# Patient Record
Sex: FEMALE | Race: WHITE | HISPANIC OR LATINO | Employment: FULL TIME | URBAN - METROPOLITAN AREA
[De-identification: names, ages, dates, MRNs, and addresses within clinical notes are randomized per-mention and may not be internally consistent; named-entity substitution may affect disease eponyms.]

---

## 2017-04-12 ENCOUNTER — ALLSCRIPTS OFFICE VISIT (OUTPATIENT)
Dept: OTHER | Facility: OTHER | Age: 27
End: 2017-04-12

## 2017-04-24 LAB
ADDITIONAL INFORMATION (HISTORICAL): NORMAL
ADEQUACY: (HISTORICAL): NORMAL
CYTOTECHNOLOGIST: (HISTORICAL): NORMAL
INTERPRETATION (HISTORICAL): NORMAL
LMP (HISTORICAL): NORMAL
PREV. BX: (HISTORICAL): NORMAL
PREV. PAP (HISTORICAL): NORMAL
REVIEWED BY (HISTORICAL): NORMAL
SOURCE (HISTORICAL): NORMAL

## 2017-11-15 ENCOUNTER — GENERIC CONVERSION - ENCOUNTER (OUTPATIENT)
Dept: OTHER | Facility: OTHER | Age: 27
End: 2017-11-15

## 2017-11-21 ENCOUNTER — LAB CONVERSION - ENCOUNTER (OUTPATIENT)
Dept: OTHER | Facility: OTHER | Age: 27
End: 2017-11-21

## 2017-11-21 LAB
ADDITIONAL INFORMATION (HISTORICAL): NORMAL
ADEQUACY: (HISTORICAL): NORMAL
COMMENT (HISTORICAL): NORMAL
CYTOTECHNOLOGIST: (HISTORICAL): NORMAL
INTERPRETATION (HISTORICAL): NORMAL
LMP (HISTORICAL): NORMAL
PREV. BX: (HISTORICAL): NORMAL
PREV. PAP (HISTORICAL): NORMAL
REVIEWED BY (HISTORICAL): NORMAL
SOURCE (HISTORICAL): NORMAL

## 2018-01-10 NOTE — RESULT NOTES
Verified Results  (Q) TISSUE PATHOLOGY 25Oct2016 12:00AM Heidy Brennan     Test Name Result Flag Reference   CLINICAL INFORMATION      none given   PATHOLOGIST      Mariluz Campos MD, (electronic signature)  Boarded in Anatomic and Clinical Pathology,  Cytopathology

## 2018-01-13 NOTE — RESULT NOTES
Verified Results  (Q) THINPREP PAP REFLEX HPV mRNA E6/E7 [id number] 12:00AM Vikki Hernadez     Test Name Result Flag Reference   CLINICAL INFORMATION:      YEARLY   LMP:      112337   PREV  PAP:      2015   PREV  BX:      NONE GIVEN   SOURCE:      Cervix   STATEMENT OF ADEQUACY:      Satisfactory for evaluation  Endocervical/transformation zone component  present     GENERAL CATEGORIZATION:  A    EPITHELIAL CELL ABNORMALITY   INTERPRETATION/RESULT:  A    Low Grade Squamous Intraepithelial Lesion (LSIL)   COMMENT:      Suggest clinical correlation and follow-up as  clinically appropriate   CYTOTECHNOLOGIST:      CHRISTIE CRAMER(ASCP)  CT screening location: [address] [facility],  [address]:      Bhavin Morris MD, (electronic signature)  Boarded in Anatomic and Clinical Pathology,  Cytopathology

## 2018-01-17 NOTE — RESULT NOTES
Verified Results  TISSUE, 2 SPECIMENS 25Oct2016 12:00AM Erika Flight     Test Name Result Flag Reference   A SOURCE      Endocervix   A PROCEDURE      Curettage   A GROSS DESCRIPTION      Ecc  In formalin  Fragments of soft tan, brown   tissue and mucus; 2 7 x 2 4 x 0 3 cm in   aggregate  A S  - 1  Gross exam(s) performed at: [address] [facility] [address], [address] [place] [id number]    : Megan Chandler MD PHD   A DIAGNOSIS      - MINUTE DETACHED FRAGMENT OF ATYPICAL SQUAMOUS   EPITHELIUM, TOO SMALL FOR FURTHER EVALUATION  - UNREMARKABLE ENDOCERVICAL TISSUE    B SOURCE      Cervix, 12 o'clock   B PROCEDURE      Biopsy/ies   B GROSS DESCRIPTION      12:00  In formalin  Single fragment of tan,   brown tissue; 0 6 x 0 4 x 0 1 cm  A S  1 in 1    B DIAGNOSIS      - SQUAMOUS ATYPIA  - The atypia noted is inconclusive for HPV effect

## 2018-01-22 VITALS
HEIGHT: 67 IN | BODY MASS INDEX: 19.78 KG/M2 | DIASTOLIC BLOOD PRESSURE: 62 MMHG | WEIGHT: 126 LBS | SYSTOLIC BLOOD PRESSURE: 100 MMHG

## 2018-10-25 ENCOUNTER — TELEPHONE (OUTPATIENT)
Dept: OBGYN CLINIC | Facility: CLINIC | Age: 28
End: 2018-10-25

## 2018-10-25 DIAGNOSIS — Z30.41 ENCOUNTER FOR SURVEILLANCE OF CONTRACEPTIVE PILLS: Primary | ICD-10-CM

## 2018-10-25 RX ORDER — NORETHINDRONE ACETATE AND ETHINYL ESTRADIOL AND FERROUS FUMARATE 1MG-20(24)
1 KIT ORAL DAILY
Qty: 24 TABLET | Refills: 1 | Status: SHIPPED | OUTPATIENT
Start: 2018-10-25 | End: 2018-11-29 | Stop reason: SDUPTHER

## 2018-10-25 NOTE — TELEPHONE ENCOUNTER
Pt has appointment scheduled for the end of November  Needs one refill of mibalis 24 fe to the [facility] to get her through please

## 2018-11-29 ENCOUNTER — ANNUAL EXAM (OUTPATIENT)
Dept: OBGYN CLINIC | Facility: CLINIC | Age: 28
End: 2018-11-29
Payer: COMMERCIAL

## 2018-11-29 VITALS
BODY MASS INDEX: 19.56 KG/M2 | SYSTOLIC BLOOD PRESSURE: 110 MMHG | DIASTOLIC BLOOD PRESSURE: 72 MMHG | WEIGHT: 124.6 LBS | HEIGHT: 67 IN

## 2018-11-29 DIAGNOSIS — Z30.41 ENCOUNTER FOR SURVEILLANCE OF CONTRACEPTIVE PILLS: ICD-10-CM

## 2018-11-29 DIAGNOSIS — Z01.419 ENCOUNTER FOR ANNUAL ROUTINE GYNECOLOGICAL EXAMINATION: Primary | ICD-10-CM

## 2018-11-29 DIAGNOSIS — R10.2 PELVIC PAIN: ICD-10-CM

## 2018-11-29 PROCEDURE — 99395 PREV VISIT EST AGE 18-39: CPT | Performed by: OBSTETRICS & GYNECOLOGY

## 2018-11-29 RX ORDER — NORETHINDRONE ACETATE AND ETHINYL ESTRADIOL AND FERROUS FUMARATE 1MG-20(24)
1 KIT ORAL DAILY
Qty: 84 TABLET | Refills: 3 | Status: SHIPPED | OUTPATIENT
Start: 2018-11-29 | End: 2019-10-10 | Stop reason: SDUPTHER

## 2018-11-29 NOTE — PROGRESS NOTES
Assessment/Plan:    Pap smear deferred due to low risk status  Encouraged self-breast examination as well as calcium supplementation  Reviewed cultures done from outside facility, within normal limits  Recommend pelvic ultrasound given nonspecific lower pelvic pain  She will also use a lubricant during intercourse  If this is unsuccessful I did discussed possibly changing birth control pills  In the meantime she will continue Minastrin 24 x 1 year  Return to office in 1 year  No problem-specific Assessment & Plan notes found for this encounter  Diagnoses and all orders for this visit:    Encounter for annual routine gynecological examination  -     Thinprep Tis Pap Reflex HPV mRNA E6/E7    Encounter for surveillance of contraceptive pills  -     Norethin Ace-Eth Estrad-FE (MINASTRIN 24 FE) 1-20 MG-MCG(24) CHEW; Chew 1 tablet daily    Pelvic pain  -     US pelvis complete w transvaginal; Future          Subjective:      Patient ID: Erlin Granados is a 34 y o  female  HPI     This is a 53-year-old female G0 who presents for annual gyn exam   Patient has been on current birth control pills for the last 5 years  Her cycles were regular up until last year, every 4 weeks lasting 4 days until last year  She states her bleeding is minimal with scant spotting  She has been on several different generic forms of birth control pill including more recently in the last 2 months  She is also sexually active and has been with a new partner over the last 6-8 weeks  She has complained of vaginal discomfort over the last 3 weeks  She was seen at an outside facility and underwent STD screening which was negative  She denies any vaginal discharge or changes in bladder or bowel function  Her urinalysis was also negative  Pap smear 2017 was within normal limits  She did receive the Gardasil vaccine at age 27  Patient graduated as  working for cosmetic facility      The following portions of the patient's history were reviewed and updated as appropriate: allergies, current medications, past family history, past medical history, past social history, past surgical history and problem list     Review of Systems   Constitutional: Negative for fatigue, fever and unexpected weight change  Respiratory: Negative for cough, chest tightness, shortness of breath and wheezing  Cardiovascular: Negative  Negative for chest pain and palpitations  Gastrointestinal: Negative  Negative for abdominal distention, abdominal pain, blood in stool, constipation, diarrhea, nausea and vomiting  Genitourinary: Negative  Negative for difficulty urinating, dyspareunia, dysuria, flank pain, frequency, genital sores, hematuria, pelvic pain, urgency, vaginal bleeding, vaginal discharge and vaginal pain  Skin: Negative for rash  Objective:      /72   Ht 5' 7" (1 702 m)   Wt 56 5 kg (124 lb 9 6 oz)   Breastfeeding? No   BMI 19 52 kg/m²          Physical Exam   Constitutional: She appears well-developed and well-nourished  Cardiovascular: Normal rate and regular rhythm  Pulmonary/Chest: Effort normal and breath sounds normal  Right breast exhibits no inverted nipple, no mass, no nipple discharge, no skin change and no tenderness  Left breast exhibits no inverted nipple, no mass, no nipple discharge, no skin change and no tenderness  Abdominal: Soft  Bowel sounds are normal  She exhibits no distension  There is no tenderness  There is no rebound and no guarding  Genitourinary: Vagina normal and uterus normal  There is no lesion on the right labia  There is no lesion on the left labia  Cervix exhibits no discharge and no friability  Right adnexum displays no mass, no tenderness and no fullness  Left adnexum displays no mass, no tenderness and no fullness  No vaginal discharge found

## 2018-12-04 LAB
CLINICAL INFO: NORMAL
CYTO CVX: NORMAL
CYTOLOGY CMNT CVX/VAG CYTO-IMP: NORMAL
DATE PREVIOUS BX: NORMAL
LMP START DATE: NORMAL
SL AMB PREV. PAP:: NORMAL
SPECIMEN SOURCE CVX/VAG CYTO: NORMAL

## 2018-12-07 ENCOUNTER — TELEPHONE (OUTPATIENT)
Dept: OBGYN CLINIC | Facility: CLINIC | Age: 28
End: 2018-12-07

## 2019-10-10 DIAGNOSIS — Z30.41 ENCOUNTER FOR SURVEILLANCE OF CONTRACEPTIVE PILLS: ICD-10-CM

## 2019-10-10 RX ORDER — NORETHINDRONE ACETATE AND ETHINYL ESTRADIOL AND FERROUS FUMARATE 1MG-20(24)
KIT ORAL
Qty: 84 TABLET | Refills: 0 | Status: SHIPPED | OUTPATIENT
Start: 2019-10-10 | End: 2019-11-04 | Stop reason: SDUPTHER

## 2019-11-04 ENCOUNTER — ANNUAL EXAM (OUTPATIENT)
Dept: OBGYN CLINIC | Facility: CLINIC | Age: 29
End: 2019-11-04
Payer: COMMERCIAL

## 2019-11-04 VITALS
SYSTOLIC BLOOD PRESSURE: 112 MMHG | WEIGHT: 132 LBS | DIASTOLIC BLOOD PRESSURE: 80 MMHG | BODY MASS INDEX: 20.72 KG/M2 | HEIGHT: 67 IN

## 2019-11-04 DIAGNOSIS — Z11.3 SCREEN FOR STD (SEXUALLY TRANSMITTED DISEASE): ICD-10-CM

## 2019-11-04 DIAGNOSIS — Z01.419 ENCOUNTER FOR ANNUAL ROUTINE GYNECOLOGICAL EXAMINATION: Primary | ICD-10-CM

## 2019-11-04 DIAGNOSIS — Z30.41 ENCOUNTER FOR SURVEILLANCE OF CONTRACEPTIVE PILLS: ICD-10-CM

## 2019-11-04 PROCEDURE — 87591 N.GONORRHOEAE DNA AMP PROB: CPT | Performed by: OBSTETRICS & GYNECOLOGY

## 2019-11-04 PROCEDURE — 87491 CHLMYD TRACH DNA AMP PROBE: CPT | Performed by: OBSTETRICS & GYNECOLOGY

## 2019-11-04 PROCEDURE — 99395 PREV VISIT EST AGE 18-39: CPT | Performed by: OBSTETRICS & GYNECOLOGY

## 2019-11-04 RX ORDER — NORETHINDRONE ACETATE AND ETHINYL ESTRADIOL AND FERROUS FUMARATE 1MG-20(24)
1 KIT ORAL DAILY
Qty: 84 TABLET | Refills: 3 | Status: SHIPPED | OUTPATIENT
Start: 2019-11-04 | End: 2020-11-09 | Stop reason: SDUPTHER

## 2019-11-04 NOTE — PROGRESS NOTES
Assessment/Plan:  Pap smear deferred due to low risk status  Cultures were obtained for GC and chlamydia off of thin prep  She is also requesting HIV/RPR screening  Encouraged safe sex practices  Continue OCPs x1 year  Return to office in 1 year or p r n  No problem-specific Assessment & Plan notes found for this encounter  Diagnoses and all orders for this visit:    Encounter for annual routine gynecological examination    Encounter for surveillance of contraceptive pills  -     Norethin Ace-Eth Estrad-FE (MELODETTA 24 FE) 1-20 MG-MCG(24) CHEW; Chew 1 tablet daily Chew    Screen for STD (sexually transmitted disease)  -     RPR  -     HIV 1/2 AG-AB combo; Future  -     Chlamydia/GC amplified DNA by PCR          Subjective:      Patient ID: Jesus Young is a 34 y o  female  HPI     This is a 82-year-old female G0 who presents for her annual gyn exam   Patient has been on birth control pills for the last 6 years  Her cycles are regular every 4 weeks lasting 2-3 days described as very light  She denies any breakthrough bleeding  There has been no changes in bowel or bladder function  In the course of the year she has had 3 partners  She does not use condoms regularly  Patient received the Gardasil vaccine at age 27  Her Pap smear 2018 within normal limits  The following portions of the patient's history were reviewed and updated as appropriate: allergies, current medications, past family history, past medical history, past social history, past surgical history and problem list     Review of Systems   Constitutional: Negative for fatigue, fever and unexpected weight change  Respiratory: Negative for cough, chest tightness, shortness of breath and wheezing  Cardiovascular: Negative  Negative for chest pain and palpitations  Gastrointestinal: Negative  Negative for abdominal distention, abdominal pain, blood in stool, constipation, diarrhea, nausea and vomiting  Genitourinary: Negative  Negative for difficulty urinating, dyspareunia, dysuria, flank pain, frequency, genital sores, hematuria, pelvic pain, urgency, vaginal bleeding, vaginal discharge and vaginal pain  Skin: Negative for rash  Objective:      /80   Ht 5' 7" (1 702 m)   Wt 59 9 kg (132 lb)   LMP 10/30/2019 (Exact Date)   BMI 20 67 kg/m²          Physical Exam   Constitutional: She appears well-developed and well-nourished  Cardiovascular: Normal rate and regular rhythm  Pulmonary/Chest: Effort normal and breath sounds normal  Right breast exhibits no inverted nipple, no mass, no nipple discharge, no skin change and no tenderness  Left breast exhibits no inverted nipple, no mass, no nipple discharge, no skin change and no tenderness  Abdominal: Soft  Bowel sounds are normal  She exhibits no distension  There is no tenderness  There is no rebound and no guarding  Genitourinary: Vagina normal and uterus normal  There is no lesion on the right labia  There is no lesion on the left labia  Cervix exhibits no discharge and no friability  Right adnexum displays no mass, no tenderness and no fullness  Left adnexum displays no mass, no tenderness and no fullness  No vaginal discharge found

## 2019-11-06 LAB
C TRACH DNA SPEC QL NAA+PROBE: NEGATIVE
N GONORRHOEA DNA SPEC QL NAA+PROBE: NEGATIVE

## 2019-11-11 LAB
HIV 1+2 AB+HIV1 P24 AG SERPL QL IA: NORMAL
RPR SER QL: NORMAL

## 2019-11-12 ENCOUNTER — TELEPHONE (OUTPATIENT)
Dept: OBGYN CLINIC | Facility: CLINIC | Age: 29
End: 2019-11-12

## 2020-11-09 ENCOUNTER — ANNUAL EXAM (OUTPATIENT)
Dept: OBGYN CLINIC | Facility: CLINIC | Age: 30
End: 2020-11-09
Payer: COMMERCIAL

## 2020-11-09 VITALS
WEIGHT: 129.4 LBS | DIASTOLIC BLOOD PRESSURE: 72 MMHG | BODY MASS INDEX: 20.31 KG/M2 | SYSTOLIC BLOOD PRESSURE: 110 MMHG | TEMPERATURE: 97.7 F | HEIGHT: 67 IN

## 2020-11-09 DIAGNOSIS — Z01.419 ENCOUNTER FOR ANNUAL ROUTINE GYNECOLOGICAL EXAMINATION: Primary | ICD-10-CM

## 2020-11-09 DIAGNOSIS — Z30.41 ENCOUNTER FOR SURVEILLANCE OF CONTRACEPTIVE PILLS: ICD-10-CM

## 2020-11-09 PROCEDURE — S0612 ANNUAL GYNECOLOGICAL EXAMINA: HCPCS | Performed by: OBSTETRICS & GYNECOLOGY

## 2020-11-09 PROCEDURE — G0145 SCR C/V CYTO,THINLAYER,RESCR: HCPCS | Performed by: OBSTETRICS & GYNECOLOGY

## 2020-11-09 RX ORDER — SPIRONOLACTONE 25 MG/1
TABLET ORAL
COMMUNITY
Start: 2020-10-30

## 2020-11-09 RX ORDER — NORETHINDRONE ACETATE AND ETHINYL ESTRADIOL AND FERROUS FUMARATE 1MG-20(24)
1 KIT ORAL DAILY
Qty: 84 TABLET | Refills: 3 | Status: SHIPPED | OUTPATIENT
Start: 2020-11-09 | End: 2021-11-03 | Stop reason: SDUPTHER

## 2020-11-09 RX ORDER — METFORMIN HYDROCHLORIDE 500 MG/1
TABLET, EXTENDED RELEASE ORAL
COMMUNITY
Start: 2020-09-15

## 2020-11-13 LAB
LAB AP GYN PRIMARY INTERPRETATION: NORMAL
Lab: NORMAL

## 2021-11-03 ENCOUNTER — ANNUAL EXAM (OUTPATIENT)
Dept: OBGYN CLINIC | Facility: CLINIC | Age: 31
End: 2021-11-03
Payer: COMMERCIAL

## 2021-11-03 VITALS
SYSTOLIC BLOOD PRESSURE: 108 MMHG | HEIGHT: 67 IN | DIASTOLIC BLOOD PRESSURE: 70 MMHG | WEIGHT: 130 LBS | BODY MASS INDEX: 20.4 KG/M2

## 2021-11-03 DIAGNOSIS — Z01.419 ENCOUNTER FOR ANNUAL ROUTINE GYNECOLOGICAL EXAMINATION: Primary | ICD-10-CM

## 2021-11-03 DIAGNOSIS — R73.03 PRE-DIABETES: ICD-10-CM

## 2021-11-03 DIAGNOSIS — Z30.41 ENCOUNTER FOR SURVEILLANCE OF CONTRACEPTIVE PILLS: ICD-10-CM

## 2021-11-03 PROCEDURE — 99395 PREV VISIT EST AGE 18-39: CPT | Performed by: OBSTETRICS & GYNECOLOGY

## 2021-11-03 RX ORDER — MAGNESIUM OXIDE 400 MG/1
400 TABLET ORAL
COMMUNITY

## 2021-11-03 RX ORDER — FOLIC ACID 1 MG/1
1 TABLET ORAL
COMMUNITY

## 2021-11-03 RX ORDER — NORETHINDRONE ACETATE AND ETHINYL ESTRADIOL AND FERROUS FUMARATE 1MG-20(24)
1 KIT ORAL DAILY
Qty: 84 TABLET | Refills: 3 | Status: SHIPPED | OUTPATIENT
Start: 2021-11-03 | End: 2021-11-19 | Stop reason: SDUPTHER

## 2021-11-03 RX ORDER — SACCHAROMYCES BOULARDII 250 MG
250 CAPSULE ORAL
COMMUNITY

## 2021-11-03 RX ORDER — ERGOCALCIFEROL (VITAMIN D2) 1250 MCG
50000 CAPSULE ORAL
COMMUNITY

## 2021-11-19 DIAGNOSIS — Z30.41 ENCOUNTER FOR SURVEILLANCE OF CONTRACEPTIVE PILLS: ICD-10-CM

## 2021-11-21 RX ORDER — NORETHINDRONE ACETATE AND ETHINYL ESTRADIOL AND FERROUS FUMARATE 1MG-20(24)
1 KIT ORAL DAILY
Qty: 84 TABLET | Refills: 3 | Status: SHIPPED | OUTPATIENT
Start: 2021-11-21

## 2022-11-03 ENCOUNTER — ANNUAL EXAM (OUTPATIENT)
Dept: OBGYN CLINIC | Facility: CLINIC | Age: 32
End: 2022-11-03

## 2022-11-03 VITALS
BODY MASS INDEX: 21.19 KG/M2 | WEIGHT: 135 LBS | SYSTOLIC BLOOD PRESSURE: 110 MMHG | DIASTOLIC BLOOD PRESSURE: 72 MMHG | HEIGHT: 67 IN

## 2022-11-03 DIAGNOSIS — Z01.419 ENCOUNTER FOR ANNUAL ROUTINE GYNECOLOGICAL EXAMINATION: Primary | ICD-10-CM

## 2022-11-03 DIAGNOSIS — Z30.41 ENCOUNTER FOR SURVEILLANCE OF CONTRACEPTIVE PILLS: ICD-10-CM

## 2022-11-03 RX ORDER — NORETHINDRONE ACETATE AND ETHINYL ESTRADIOL AND FERROUS FUMARATE 1MG-20(24)
1 KIT ORAL DAILY
Qty: 84 TABLET | Refills: 3 | Status: SHIPPED | OUTPATIENT
Start: 2022-11-03

## 2022-11-03 NOTE — PROGRESS NOTES
Assessment/Plan:  Pap smear done as well as annual   Encouraged self-breast examination as well as calcium supplementation  Continue Loestrin 1/20 x1 year  Discussed changing OCPs to improve menstrual cycle  She will notify me if she is interested  We also discussed potential future pregnancy, pre conceptual folic acid  All questions answered  She will return to office in 1 year or p r n  No problem-specific Assessment & Plan notes found for this encounter  Diagnoses and all orders for this visit:    Encounter for annual routine gynecological examination  -     Thinprep Tis and HPV mRNA E6/E7    Encounter for surveillance of contraceptive pills  -     Norethin Ace-Eth Estrad-FE (Melodetta 24 Fe) 1-20 MG-MCG(24) CHEW; Chew 1 tablet daily Chew          Subjective:      Patient ID: Kim Talbot is a 29 y o  female  HPI     This is a very pleasant 29-year-old female G0 presents for gyn exam   She states her menstrual cycles have been approximately every 4 weeks lasting 1-7 days with brown spotting  She has been on current birth control pill for over 10 years  She is sexually active and has been in a monogamous relationship for 4 years  She will be getting  04/2023 in [facility]  Her and her partner live in [place]  She did receive the Gardasil vaccine age 27  She did have a colposcopy in 2016  Pap smears have been normal since then  Patient has been on metformin for approximately 3 years, diagnosed with pre diabetes this is managed through her primary care physician  The following portions of the patient's history were reviewed and updated as appropriate: allergies, current medications, past family history, past medical history, past social history, past surgical history and problem list     Review of Systems   Constitutional: Negative for fatigue, fever and unexpected weight change  Respiratory: Negative for cough, chest tightness, shortness of breath and wheezing      Cardiovascular: Negative  Negative for chest pain and palpitations  Gastrointestinal: Negative  Negative for abdominal distention, abdominal pain, blood in stool, constipation, diarrhea, nausea and vomiting  Genitourinary: Negative  Negative for difficulty urinating, dyspareunia, dysuria, flank pain, frequency, genital sores, hematuria, pelvic pain, urgency, vaginal bleeding, vaginal discharge and vaginal pain  Skin: Negative for rash  Objective:      /72   Ht 5' 7" (1 702 m)   Wt 61 2 kg (135 lb)   LMP 10/29/2022   BMI 21 14 kg/m²          Physical Exam  Constitutional:       Appearance: Normal appearance  She is well-developed  Cardiovascular:      Rate and Rhythm: Normal rate and regular rhythm  Pulmonary:      Effort: Pulmonary effort is normal       Breath sounds: Normal breath sounds  Chest:   Breasts:      Right: No inverted nipple, mass, nipple discharge, skin change or tenderness  Left: No inverted nipple, mass, nipple discharge, skin change or tenderness  Abdominal:      General: Bowel sounds are normal  There is no distension  Palpations: Abdomen is soft  Tenderness: There is no abdominal tenderness  There is no guarding or rebound  Genitourinary:     Labia:         Right: No rash, tenderness or lesion  Left: No rash, tenderness or lesion  Vagina: Normal  No signs of injury  No vaginal discharge or tenderness  Cervix: No cervical motion tenderness, discharge, friability, lesion, erythema or cervical bleeding  Uterus: Not enlarged and not tender  Adnexa:         Right: No mass, tenderness or fullness  Left: No mass, tenderness or fullness  Neurological:      Mental Status: She is alert and oriented to person, place, and time     Psychiatric:         Behavior: Behavior normal

## 2022-11-09 ENCOUNTER — TELEPHONE (OUTPATIENT)
Dept: OBGYN CLINIC | Facility: CLINIC | Age: 32
End: 2022-11-09

## 2022-11-09 LAB
CLINICAL INFO: ABNORMAL
CYTO CVX: ABNORMAL
CYTOLOGY CMNT CVX/VAG CYTO-IMP: ABNORMAL
DATE PREVIOUS BX: ABNORMAL
GEN CATEG CVX/VAG CYTO-IMP: ABNORMAL
HPV E6+E7 MRNA CVX QL NAA+PROBE: DETECTED
LMP START DATE: ABNORMAL
SL AMB PREV. PAP:: ABNORMAL
SPECIMEN SOURCE CVX/VAG CYTO: ABNORMAL

## 2022-11-09 NOTE — TELEPHONE ENCOUNTER
----- Message from David Corona DO sent at 11/9/2022  1:04 PM EST -----  Inform patient Pap smear slightly abnormal, recommend colposcopy  NSAIDs prior to office visit

## 2022-11-09 NOTE — TELEPHONE ENCOUNTER
Pt informed of pap results & recom f/u with colposcopy  Pt prev had colposcopy 2016 for LGSIL  Pt lives in [facility] & will be here in 2/2022 - appt scheduled

## 2023-02-20 ENCOUNTER — PROCEDURE VISIT (OUTPATIENT)
Dept: OBGYN CLINIC | Facility: CLINIC | Age: 33
End: 2023-02-20

## 2023-02-20 VITALS
HEIGHT: 67 IN | BODY MASS INDEX: 20.88 KG/M2 | DIASTOLIC BLOOD PRESSURE: 68 MMHG | WEIGHT: 133 LBS | SYSTOLIC BLOOD PRESSURE: 106 MMHG

## 2023-02-20 DIAGNOSIS — R87.810 ATYPICAL SQUAMOUS CELL CHANGES OF UNDETERMINED SIGNIFICANCE (ASCUS) ON CERVICAL CYTOLOGY WITH POSITIVE HIGH RISK HUMAN PAPILLOMA VIRUS (HPV): Primary | ICD-10-CM

## 2023-02-20 DIAGNOSIS — R87.610 ATYPICAL SQUAMOUS CELL CHANGES OF UNDETERMINED SIGNIFICANCE (ASCUS) ON CERVICAL CYTOLOGY WITH POSITIVE HIGH RISK HUMAN PAPILLOMA VIRUS (HPV): Primary | ICD-10-CM

## 2023-02-20 NOTE — PROGRESS NOTES
Assessment/Plan:  Colposcopy done with [facility], 12  She will call for results in 1 to 2 weeks  Implications of dysplasia reviewed  Provided above normal return to office in 1 year for repeat Pap  No problem-specific Assessment & Plan notes found for this encounter  Diagnoses and all orders for this visit:    Atypical squamous cell changes of undetermined significance (ASCUS) on cervical cytology with positive high risk human papilloma virus (HPV)  -     Tissue Pathology  -     Colposcopy          Subjective:      Patient ID: Erlin Granados is a 29 y o  female  HPI       This is a very pleasant 82-year-old female G0 who presents for colposcopy  Pap smear had revealed ASCUS positive HPV  Patient has been in a monogamous relationship for over 4 years, marrying 4/2023  She did receive the Gardasil vaccine at age 27  She did have a colposcopy done in 2016 for low-grade RACH  Implications of  ASCUS, +HPV reviewed    The following portions of the patient's history were reviewed and updated as appropriate: allergies, current medications, past family history, past medical history, past social history, past surgical history and problem list     Review of Systems   Constitutional: Negative for fatigue, fever and unexpected weight change  Respiratory: Negative for cough, chest tightness, shortness of breath and wheezing  Cardiovascular: Negative  Negative for chest pain and palpitations  Gastrointestinal: Negative  Negative for abdominal distention, abdominal pain, blood in stool, constipation, diarrhea, nausea and vomiting  Genitourinary: Negative  Negative for difficulty urinating, dyspareunia, dysuria, flank pain, frequency, genital sores, hematuria, pelvic pain, urgency, vaginal bleeding, vaginal discharge and vaginal pain  Skin: Negative for rash           Objective:      /68   Ht 5' 7" (1 702 m)   Wt 60 3 kg (133 lb)   LMP 02/15/2023   BMI 20 83 kg/m²          Physical Exam Colposcopy     Date/Time 2/20/2023 3:49 PM     Universal Protocol   Consent: Verbal consent obtained  Consent given by: patient  Patient understanding: patient states understanding of the procedure being performed  Patient consent: the patient's understanding of the procedure matches consent given  Patient identity confirmed: verbally with patient       Performed by  Brielle Lopez DO     Authorized by Brielle Lopez DO        Pre-procedure details     Premeds:  Ibuprofen     Indication    ASC-US     Procedure Details   Procedure: Colposcopy w/ cervical biopsy and ECC      Under satisfactory analgesia the patient was prepped and draped in the dorsal lithotomy position: yes      Alpine speculum was placed in the vagina: yes      Under colposcopic examination the transition zone was seen in entirety: yes      Endocervix was curetted using a Kevorkian curette: yes      Cervical biopsy performed with a cervical biopsy punch: yes      Monsel's solution was applied: yes      Biopsy(s): yes      Specimen to pathology: yes       Post-procedure      Findings: White epithelium      Impression: Low grade cervical dysplasia      Patient tolerance of procedure: Tolerated well, no immediate complications     Comments       Cervix was cleansed with acetic acid  The squamocolumnar junction was visualized  She had faint mosaic 12 o'clock  Colposcopy was carried out on a green filter light  ECC was performed first followed by biopsy at 12  Hemostasis was maintained using Monsel solution  All instruments removed from the vagina  Patient tolerated the procedure well

## 2023-02-23 LAB
CLINICAL INFO: NORMAL
SPECIMEN SOURCE: NORMAL
SPECIMEN SOURCE: NORMAL

## 2023-02-27 ENCOUNTER — TELEPHONE (OUTPATIENT)
Dept: OBGYN CLINIC | Facility: CLINIC | Age: 33
End: 2023-02-27

## 2023-02-27 NOTE — TELEPHONE ENCOUNTER
----- Message from Shaji Vásquez DO sent at 2/27/2023 12:41 PM EST -----  Please call the patient inform colposcopy biopsy revealed no dysplasia  Return to office 1 year for annual visit and repeat Pap smear

## 2023-09-18 DIAGNOSIS — Z30.41 ENCOUNTER FOR SURVEILLANCE OF CONTRACEPTIVE PILLS: ICD-10-CM

## 2023-09-18 RX ORDER — NORETHINDRONE ACETATE AND ETHINYL ESTRADIOL AND FERROUS FUMARATE 1MG-20(24)
1 KIT ORAL DAILY
Qty: 84 TABLET | Refills: 3 | Status: SHIPPED | OUTPATIENT
Start: 2023-09-18

## 2024-03-19 ENCOUNTER — ANNUAL EXAM (OUTPATIENT)
Dept: OBGYN CLINIC | Facility: CLINIC | Age: 34
End: 2024-03-19
Payer: COMMERCIAL

## 2024-03-19 VITALS — SYSTOLIC BLOOD PRESSURE: 104 MMHG | BODY MASS INDEX: 20.52 KG/M2 | DIASTOLIC BLOOD PRESSURE: 66 MMHG | WEIGHT: 131 LBS

## 2024-03-19 DIAGNOSIS — Z30.09 BIRTH CONTROL COUNSELING: ICD-10-CM

## 2024-03-19 DIAGNOSIS — Z01.419 ENCOUNTER FOR ANNUAL ROUTINE GYNECOLOGICAL EXAMINATION: Primary | ICD-10-CM

## 2024-03-19 PROCEDURE — 99395 PREV VISIT EST AGE 18-39: CPT | Performed by: OBSTETRICS & GYNECOLOGY

## 2024-03-19 RX ORDER — LEVOTHYROXINE, LIOTHYRONINE 9.5; 2.25 UG/1; UG/1
TABLET ORAL
COMMUNITY
Start: 2024-02-27

## 2024-03-19 NOTE — PROGRESS NOTES
Assessment/Plan:  Pap done for cytology, reflex HPV.  Encouraged self breast examination as well as calcium supplementation.  Discussed contraception.  She is interested in the IUD.  Discussed progesterone IUD versus copper IUD.  Risks and benefits reviewed.  She is leaning more towards progesterone IUD (Mirena versus Kyleena versus possible Keyla) given her menstrual cycle.  We discussed the use of Cytotec given cervical anatomy.  She may consider having this administered back home for convenience.  She will keep me updated.  She will continue to follow-up with primary care and dermatology as scheduled.  Return to office in 1 year or as needed  No problem-specific Assessment & Plan notes found for this encounter.       Diagnoses and all orders for this visit:    Encounter for annual routine gynecological examination  -     Thinprep Tis Pap Reflex HPV mRNA E6/E7    Birth control counseling    Other orders  -     NP Thyroid 15 MG tablet          Subjective:      Patient ID: Debbi Mcdowell is a 33 y.o. female.    HPI this is a very pleasant 33-year-old female with G0 presents for her GYN exam.    She been off the birth control pill since 6/2023.  Her cycles are regular every 28 days lasting 7 days described as heavy for 4 days.  She denies any changes in bowel or bladder function.  She is sexually active, monogamous relationship for over 5 years, /2023.  She lives in [place] and visits her family locally.    She did receive the Gardasil vaccine age 20.  She did have her egg harvest last year.  Not interested in pregnancy in the next couple of years.  She has a history of Pap smears.    2/2023 colposcopy, no dysplasia    The following portions of the patient's history were reviewed and updated as appropriate: allergies, current medications, past family history, past medical history, past social history, past surgical history, and problem list.    Review of Systems   Constitutional:  Negative for fatigue, fever and  unexpected weight change.   Respiratory:  Negative for cough, chest tightness, shortness of breath and wheezing.    Cardiovascular: Negative.  Negative for chest pain and palpitations.   Gastrointestinal: Negative.  Negative for abdominal distention, abdominal pain, blood in stool, constipation, diarrhea, nausea and vomiting.   Genitourinary: Negative.  Negative for difficulty urinating, dyspareunia, dysuria, flank pain, frequency, genital sores, hematuria, pelvic pain, urgency, vaginal bleeding, vaginal discharge and vaginal pain.   Skin:  Negative for rash.         Objective:      /66   Wt 59.4 kg (131 lb)   LMP 02/19/2024 (Exact Date)   BMI 20.52 kg/m²          Physical Exam  Constitutional:       Appearance: Normal appearance. She is well-developed.   HENT:      Head: Normocephalic and atraumatic.   Cardiovascular:      Rate and Rhythm: Normal rate and regular rhythm.   Pulmonary:      Effort: Pulmonary effort is normal.      Breath sounds: Normal breath sounds.   Chest:   Breasts:     Right: No inverted nipple, mass, nipple discharge, skin change or tenderness.      Left: No inverted nipple, mass, nipple discharge, skin change or tenderness.   Abdominal:      General: Bowel sounds are normal. There is no distension.      Palpations: Abdomen is soft.      Tenderness: There is no abdominal tenderness. There is no guarding or rebound.   Genitourinary:     Labia:         Right: No rash, tenderness or lesion.         Left: No rash, tenderness or lesion.       Vagina: Normal. No signs of injury. No vaginal discharge or tenderness.      Cervix: No cervical motion tenderness, discharge, friability, lesion or cervical bleeding.      Uterus: Not enlarged and not tender.       Adnexa:         Right: No mass, tenderness or fullness.          Left: No mass, tenderness or fullness.     Neurological:      Mental Status: She is alert.   Psychiatric:         Behavior: Behavior normal.         The vagina is well  estrogenized.  Cervix is small nulliparous, os is slightly stenotic.

## 2024-05-07 ENCOUNTER — TELEPHONE (OUTPATIENT)
Dept: OBGYN CLINIC | Facility: CLINIC | Age: 34
End: 2024-05-07

## 2025-03-31 ENCOUNTER — ANNUAL EXAM (OUTPATIENT)
Dept: OBGYN CLINIC | Facility: CLINIC | Age: 35
End: 2025-03-31
Payer: COMMERCIAL

## 2025-03-31 VITALS
BODY MASS INDEX: 19.62 KG/M2 | DIASTOLIC BLOOD PRESSURE: 68 MMHG | HEIGHT: 67 IN | WEIGHT: 125 LBS | SYSTOLIC BLOOD PRESSURE: 112 MMHG

## 2025-03-31 DIAGNOSIS — Z01.419 ENCOUNTER FOR ANNUAL ROUTINE GYNECOLOGICAL EXAMINATION: Primary | ICD-10-CM

## 2025-03-31 DIAGNOSIS — Z20.828 EXPOSURE TO HERPES SIMPLEX VIRUS (HSV): ICD-10-CM

## 2025-03-31 PROCEDURE — 99395 PREV VISIT EST AGE 18-39: CPT | Performed by: OBSTETRICS & GYNECOLOGY

## 2025-03-31 RX ORDER — RIZATRIPTAN BENZOATE 10 MG/1
TABLET ORAL
COMMUNITY
Start: 2024-12-23

## 2025-03-31 NOTE — PROGRESS NOTES
Name: Debbi Mcdowell      : 1990      MRN: [id number]  Encounter Provider: Elizabeth Ibarra DO  Encounter Date: 3/31/2025   Encounter department: [facility]  :  Assessment & Plan  Encounter for annual routine gynecological examination    Orders:    Thinprep Tis Pap Reflex HPV mRNA E6/E7    Exposure to herpes simplex virus (HSV)    Orders:    HSV 1/2 IgM and Type Specific IgG; Future    Pap done for cytology reflex HPV.  Encouraged self breast examination as well as calcium supplementation.  Reviewed 's HSV history, new onset x 4 weeks.  Implications reviewed.  Will obtain serology HSV.  All questions answered.  Return to office in 1 year.  I will notify her of the above results.    History of Present Illness   HPI  Debbi Mcdowell is a 34 y.o. female who presents     This is a pleasant 34-year-old female G0 who presents for her GYN exam.  She has been off birth control pills since 2023.  Her menstrual cycles are regular every 28 days lasting 4 days with no breakthrough bleeding.  She denies any changes in bowel or bladder function.  She is sexually active, monogamous relationship for over 6 years,  x 2 years.  She lives in [place] and visits her family locally.  She did receive the Gardasil vaccine at age 20.  She did have her egg harvest approximately 2 years ago.  Patient states  had episode of genital ulcer.  He was evaluated and diagnosed with HSV-2 on 2025.  He is not taking Valtrex suppression.  Patient has had no symptoms, no discomfort no ulcerations.    Colposcopy 2023 no dysplasia    Pap 3/2024    +HSV-2 2025      History obtained from: patient    Review of Systems   Constitutional:  Negative for fatigue, fever and unexpected weight change.   Respiratory:  Negative for cough, chest tightness, shortness of breath and wheezing.    Cardiovascular: Negative.  Negative for chest pain and palpitations.   Gastrointestinal: Negative.  Negative for abdominal distention,  "abdominal pain, blood in stool, constipation, diarrhea, nausea and vomiting.   Genitourinary: Negative.  Negative for difficulty urinating, dyspareunia, dysuria, flank pain, frequency, genital sores, hematuria, pelvic pain, urgency, vaginal bleeding, vaginal discharge and vaginal pain.   Skin:  Negative for rash.     Current Outpatient Medications on File Prior to Visit   Medication Sig Dispense Refill    Cyanocobalamin (VITAMIN B-12 PO) Take by mouth      ergocalciferol (ERGOCALCIFEROL) 1.25 MG (95840 UT) capsule Take 50,000 Units by mouth      magnesium oxide (MAG-OX) 400 mg tablet Take 400 mg by mouth      metFORMIN (GLUCOPHAGE-XR) 500 mg 24 hr tablet       NP Thyroid 15 MG tablet       rizatriptan (MAXALT) 10 mg tablet       saccharomyces boulardii (FLORASTOR) 250 mg capsule Take 250 mg by mouth      spironolactone (ALDACTONE) 25 mg tablet       Cholecalciferol 25 MCG (1000 UT) tablet Take 1,000 Units by mouth (Patient not taking: Reported on 3/31/2025)      Cyanocobalamin 5000 MCG/ML LIQD Place under the tongue      folic acid (FOLVITE) 1 mg tablet Take 1 mg by mouth (Patient not taking: Reported on 3/31/2025)       No current facility-administered medications on file prior to visit.         Objective   /68   Ht 5' 7\" (1.702 m)   Wt 56.7 kg (125 lb)   LMP 03/24/2025 (Exact Date)   BMI 19.58 kg/m²      Physical Exam  Constitutional:       Appearance: Normal appearance. She is well-developed.   HENT:      Head: Normocephalic and atraumatic.   Cardiovascular:      Rate and Rhythm: Normal rate and regular rhythm.   Pulmonary:      Effort: Pulmonary effort is normal.      Breath sounds: Normal breath sounds.   Chest:   Breasts:     Right: No inverted nipple, mass, nipple discharge, skin change or tenderness.      Left: No inverted nipple, mass, nipple discharge, skin change or tenderness.   Abdominal:      General: Bowel sounds are normal. There is no distension.      Palpations: Abdomen is soft.      " Tenderness: There is no abdominal tenderness. There is no guarding or rebound.   Genitourinary:     Labia:         Right: No rash, tenderness or lesion.         Left: No rash, tenderness or lesion.       Vagina: Normal. No signs of injury. No vaginal discharge or tenderness.      Cervix: No cervical motion tenderness, discharge, friability, lesion or cervical bleeding.      Uterus: Not enlarged and not tender.       Adnexa:         Right: No mass, tenderness or fullness.          Left: No mass, tenderness or fullness.     Neurological:      Mental Status: She is alert.   Psychiatric:         Behavior: Behavior normal.         Administrative Statements   I have spent a total time of 30 minutes in caring for this patient on the day of the visit/encounter including Prognosis, Risks and benefits of tx options, Instructions for management, Impressions, Counseling / Coordination of care, Documenting in the medical record, Reviewing/placing orders in the medical record (including tests, medications, and/or procedures), and Obtaining or reviewing history  .

## 2025-04-02 LAB
HSV1 IGG SER IA-ACNC: 1.01 INDEX
HSV2 IGG SER IA-ACNC: <0.9 INDEX
